# Patient Record
Sex: MALE | Race: BLACK OR AFRICAN AMERICAN | NOT HISPANIC OR LATINO | URBAN - METROPOLITAN AREA
[De-identification: names, ages, dates, MRNs, and addresses within clinical notes are randomized per-mention and may not be internally consistent; named-entity substitution may affect disease eponyms.]

---

## 2018-03-12 ENCOUNTER — EMERGENCY (EMERGENCY)
Facility: HOSPITAL | Age: 28
LOS: 1 days | Discharge: HOME | End: 2018-03-12
Attending: EMERGENCY MEDICINE | Admitting: EMERGENCY MEDICINE
Payer: COMMERCIAL

## 2018-03-12 VITALS
HEART RATE: 79 BPM | SYSTOLIC BLOOD PRESSURE: 129 MMHG | TEMPERATURE: 97 F | DIASTOLIC BLOOD PRESSURE: 89 MMHG | OXYGEN SATURATION: 100 % | RESPIRATION RATE: 20 BRPM

## 2018-03-12 DIAGNOSIS — N44.00 TORSION OF TESTIS, UNSPECIFIED: Chronic | ICD-10-CM

## 2018-03-12 LAB
APPEARANCE UR: CLEAR — SIGNIFICANT CHANGE UP
BILIRUB UR-MCNC: NEGATIVE — SIGNIFICANT CHANGE UP
BLOOD UR QL VISUAL: NEGATIVE — SIGNIFICANT CHANGE UP
COLOR SPEC: YELLOW — SIGNIFICANT CHANGE UP
GLUCOSE UR-MCNC: NEGATIVE — SIGNIFICANT CHANGE UP
KETONES UR-MCNC: SIGNIFICANT CHANGE UP
LEUKOCYTE ESTERASE UR-ACNC: NEGATIVE — SIGNIFICANT CHANGE UP
MUCOUS THREADS # UR AUTO: SIGNIFICANT CHANGE UP
NITRITE UR-MCNC: NEGATIVE — SIGNIFICANT CHANGE UP
NON-SQ EPI CELLS # UR AUTO: <1 — SIGNIFICANT CHANGE UP
PH UR: 6 — SIGNIFICANT CHANGE UP (ref 4.6–8)
PROT UR-MCNC: 20 MG/DL — SIGNIFICANT CHANGE UP
RBC CASTS # UR COMP ASSIST: SIGNIFICANT CHANGE UP (ref 0–?)
SP GR SPEC: 1.03 — SIGNIFICANT CHANGE UP (ref 1–1.04)
SQUAMOUS # UR AUTO: SIGNIFICANT CHANGE UP
UROBILINOGEN FLD QL: 2 MG/DL — HIGH
WBC UR QL: SIGNIFICANT CHANGE UP (ref 0–?)

## 2018-03-12 PROCEDURE — 76870 US EXAM SCROTUM: CPT | Mod: 26

## 2018-03-12 PROCEDURE — 99284 EMERGENCY DEPT VISIT MOD MDM: CPT

## 2018-03-12 NOTE — ED PROVIDER NOTE - ATTENDING CONTRIBUTION TO CARE
26 yo M w/ PMH of testicular torsion at age 14, s/p L orchiectomy, presenting w/ CC of R testicular pain x 2 days. The pain began during the afternoon on 03/10/2018 while patient was cleaning. Has intermittent episodes of severe R testicular pain since that time. Patient can't remember what previous testicular torsion pain felt like, but comes in to make sure that it is not happening again. No pain presently in the ED. Denies any urinary symptoms, hematuria, discharge, painful urination. Denies abdominal pain, nausea, vomiting, diarrhea, cp, sob, headache, vision change, weakness, dizziness, lightheadedness, numbness, tingling. On exam: in no distress afebrile, genital exam: normal circumcised penis, No hernias. S/P L orchiectomy. R testicle + cremasteric reflex, non tender, + mild epididimal tenderness. IMP : R testicular pain likely epididimitis. Torsion unlikely based on exam: plan: UA urine for GC/chlamydia, urgent US testicle.

## 2018-03-12 NOTE — ED PROVIDER NOTE - OBJECTIVE STATEMENT
26 yo M w/ PMH of testicular torsion at age 14, s/p L orchiectomy, presenting w/ CC of R testicular pain x 2 days. The pain began during the afternoon on 03/10/2018 while patient was cleaning. Has intermittent episodes of severe R testicular pain since that time. Patient can't remember what previous testicular torsion pain felt like, but comes in to make sure that it is not happening again. No pain presently in the ED. Denies any urinary symptoms, hematuria, discharge, painful urination. Denies abdominal pain, nausea, vomiting, diarrhea, cp, sob, headache, vision change, weakness, dizziness, lightheadedness, numbness, tingling.

## 2018-03-12 NOTE — ED PROVIDER NOTE - MEDICAL DECISION MAKING DETAILS
28 yo M w/ PMH of testicular torsion at age 14, with removal of left testicle, presenting with intermittent severe R testicular pain x 2 days. No urinary symptoms. No abdominal complaints. + Cremasteric Reflex. No pain at rest in the ED. Some pain with palpation of the epididymis. Will obtain ua, urine culture, gonorrhea/chlamydia testing, and US of the R testicle. Reassess.

## 2018-03-12 NOTE — ED ADULT NURSE NOTE - OBJECTIVE STATEMENT
Pt rcvd in room 7, aox3, ambulatory, valentin/o R testicular pain since Saturday, h/o L testicle removal when he was 14 due to testicular torsion. Pt denies other complaints. MD yang done, VS as noted, NAD, sent to US.

## 2018-03-12 NOTE — ED ADULT TRIAGE NOTE - CHIEF COMPLAINT QUOTE
Pt. developed sharp testicular pain on Saturday. Pt.'s right testicle is throbbing. Left one was removed when he was 14 due to testicular torsion. Pt. appears comfortable at triage.

## 2018-03-12 NOTE — ED PROVIDER NOTE - CARE PLAN
Principal Discharge DX:	Hydrocele  Assessment and plan of treatment:	Please follow up with your primary care provider as soon as possible. You may take 600mg of ibuprofen (example: motrin or advil) every 4-6 hours for baseline pain control as indicated with respect to the warnings on the label. This is an over the counter medication. You may take 1000mg of Tylenol every 6 hours for baseline pain control with respect to the warnings on the label. Please return to the ED if you experience testicular pain, pain with urination, fever, chest pain, shortness of breath, abdominal pain, nausea, vomiting, or for any other concerns.

## 2018-03-12 NOTE — ED PROVIDER NOTE - GENITOURINARY, MLM
External genitalia is normal. Positive Cremasteric Reflex. No lesions. Tenderness to palpation of the epididymis.

## 2018-03-12 NOTE — ED PROVIDER NOTE - PLAN OF CARE
Please follow up with your primary care provider as soon as possible. You may take 600mg of ibuprofen (example: motrin or advil) every 4-6 hours for baseline pain control as indicated with respect to the warnings on the label. This is an over the counter medication. You may take 1000mg of Tylenol every 6 hours for baseline pain control with respect to the warnings on the label. Please return to the ED if you experience testicular pain, pain with urination, fever, chest pain, shortness of breath, abdominal pain, nausea, vomiting, or for any other concerns.

## 2018-03-13 VITALS
DIASTOLIC BLOOD PRESSURE: 90 MMHG | RESPIRATION RATE: 18 BRPM | OXYGEN SATURATION: 100 % | HEART RATE: 70 BPM | SYSTOLIC BLOOD PRESSURE: 122 MMHG

## 2018-03-14 LAB
BACTERIA UR CULT: SIGNIFICANT CHANGE UP
SPECIMEN SOURCE: SIGNIFICANT CHANGE UP

## 2022-08-24 NOTE — ED ADULT NURSE NOTE - TEMPLATE LIST FOR HEAD TO TOE ASSESSMENT
General Samples Given: TargoDox 50 mg 1 tablet QD x10 days. Plan: Return in 6 months. Detail Level: Zone Continue Regimen: Spironolactone 50 mg.  Incease to 1 tablet 2x a day.